# Patient Record
Sex: FEMALE | Race: WHITE | NOT HISPANIC OR LATINO | Employment: UNEMPLOYED | URBAN - METROPOLITAN AREA
[De-identification: names, ages, dates, MRNs, and addresses within clinical notes are randomized per-mention and may not be internally consistent; named-entity substitution may affect disease eponyms.]

---

## 2023-08-15 ENCOUNTER — OFFICE VISIT (OUTPATIENT)
Dept: OBSTETRICS AND GYNECOLOGY | Facility: CLINIC | Age: 33
End: 2023-08-15
Payer: COMMERCIAL

## 2023-08-15 VITALS
BODY MASS INDEX: 20.09 KG/M2 | WEIGHT: 125 LBS | HEIGHT: 66 IN | DIASTOLIC BLOOD PRESSURE: 58 MMHG | SYSTOLIC BLOOD PRESSURE: 106 MMHG

## 2023-08-15 DIAGNOSIS — N92.6 MISSED MENSES: Primary | ICD-10-CM

## 2023-08-15 LAB
B-HCG UR QL: POSITIVE
BILIRUB BLD-MCNC: NEGATIVE MG/DL
CLARITY, POC: CLEAR
COLOR UR: YELLOW
EXPIRATION DATE: ABNORMAL
GLUCOSE UR STRIP-MCNC: NEGATIVE MG/DL
INTERNAL NEGATIVE CONTROL: ABNORMAL
INTERNAL POSITIVE CONTROL: ABNORMAL
KETONES UR QL: NEGATIVE
LEUKOCYTE EST, POC: NEGATIVE
Lab: ABNORMAL
NITRITE UR-MCNC: NEGATIVE MG/ML
PH UR: 5 [PH] (ref 5–8)
PROT UR STRIP-MCNC: NEGATIVE MG/DL
RBC # UR STRIP: NEGATIVE /UL
SP GR UR: 1 (ref 1–1.03)
UROBILINOGEN UR QL: NORMAL

## 2023-08-15 RX ORDER — PRENATAL VIT/IRON FUM/FOLIC AC 27MG-0.8MG
1 TABLET ORAL
COMMUNITY

## 2023-08-15 NOTE — PROGRESS NOTES
Confirmation of pregnancy     Chief Complaint   Patient presents with    Amenorrhea         Hannah Johnson is being seen today for evaluation of absence of menses. Due to her period being late, she tested for pregnancy and had + home UPT. She is a 33 y.o. . This problem is new to me, the examiner.       Currently breastfeeding; had 4 menstrual cycles since  in 3/2022  Just moved here from [place] 9-10 months ago       LNMP: 23  Confident with date: Yes  Taking prenatal vitamins: Yes. Needs RX: No  Planned pregnancy: No  Prior obstetric issues, potential pregnancy concerns: GBS+; & possible PP depression; - all ; delivered in [place] (records requested)  Family history of genetic issues (includes FOB): no  Varicella Hx: yes  Flu vaccine: no  COVID 19 vaccine: no. Booster vaccine: no  History of STDs: no  Current medications: PNV, Tylenol PRN  Last pap smear: unsure  Smoker: No  Drug or alcohol abuse: No  H/O physical, emotional or sexual abuse:no  H/O mental health disorder: anxiety; was on medication 7-8 years ago; stable now  Prior testing for Cystic Fibrosis Carrier or Sickle Cell Trait- no  Prepregnancy BMI - Body mass index is 20.18 kg/mý.    Past Medical History:   Diagnosis Date    Anxiety     Migraine        Past Surgical History:   Procedure Laterality Date    WISDOM TOOTH EXTRACTION           Current Outpatient Medications:     Prenatal Vit-Fe Fumarate-FA (prenatal vitamin 27-0.8) 27-0.8 MG tablet tablet, Take 1 tablet by mouth., Disp: , Rfl:     Allergies   Allergen Reactions    Penicillins Other (See Comments)     Unknown-happened as a child    Sulfa Antibiotics Other (See Comments)     Unknown-happened as a child       Social History     Socioeconomic History    Marital status:    Tobacco Use    Smoking status: Never    Smokeless tobacco: Never   Vaping Use    Vaping Use: Never used   Substance and Sexual Activity    Alcohol use: Not Currently    Drug use: Never    Sexual  "activity: Yes     Partners: Male     Birth control/protection: None       Family History   Problem Relation Age of Onset    Hypertension Father     Hypertension Mother     Coronary artery disease Paternal Grandfather     Stroke Paternal Grandmother     Ovarian cancer Maternal Grandmother         d'xd later in life    Hypertension Maternal Grandfather        Review of systems     Review of Systems   Gastrointestinal:  Positive for nausea. Negative for vomiting.   Genitourinary:  Positive for menstrual problem. Negative for pelvic pain and vaginal bleeding.   Musculoskeletal:  Positive for back pain.     Objective    /58   Ht 167.6 cm (66\")   Wt 56.7 kg (125 lb)   LMP 06/23/2023 (Exact Date)   BMI 20.18 kg/mý     Physical Exam  Vitals reviewed.   Constitutional:       General: She is awake. She is not in acute distress.     Appearance: She is not ill-appearing.   Eyes:      Conjunctiva/sclera: Conjunctivae normal.   Pulmonary:      Effort: Pulmonary effort is normal. No respiratory distress.   Musculoskeletal:      Cervical back: Neck supple. No rigidity.   Skin:     General: Skin is warm and dry.      Capillary Refill: Capillary refill takes less than 2 seconds.   Neurological:      Mental Status: She is alert and oriented to person, place, and time.   Psychiatric:         Mood and Affect: Mood and affect normal.         Behavior: Behavior normal.       Assessment/Plan      Missed menses: + UPT in office. LNMP 6/23/23 = 7 week EGA with an EDC=3/29/2024. US confirms IUP with +FCA: Yes. Oriented to practice.     Pregnancy: Disc importance of regular prenatal care. Enc PNV daily. Counseled on providers and on call phone. Disc Tylenol products are ok and encouraged no ibuprofen or ASA in pregnancy.  Disc exercise in pregnancy, diet, expected weight gain, etc. Enc no use of tobacco, vaping, drugs, or alcohol during pregnancy. Rev warn s/s of SAB.     Labs: Pt counseled on genetic screening, Quad screen, AFP, and " NIPS. Does NOT want genetic screening.    BMI is within normal parameters. No other follow-up for BMI required.        Smoker- non-smoker    6.   COVID19 precautions were reviewed with the patient. Continue to encourage social distancing, wearing a mask, and good hand hygiene.  I wore a mask, protective eye wear, and gloves during this patient encounter.  Patient also wearing a surgical mask and social distancing was observed. Hand hygeine performed before and after seeing the patient. Also encouraged COVID booster vaccine at 6 month interval from last COVID vaccine.     7.  Flu vaccine. Encouraged the flu vaccine during pregnancy. Discuss normal physiological changes during pregnancy increase the susceptibility of the flu virus and increase the risk of severe illness for the pregnant woman. Disc flu can be harmful to the unborn baby as well. Enc the flu vaccine. Disc with patient that getting the flu vaccine is the first and most important step in protecting against the flu. Flu vaccines given during pregnancy help protect both the mother and the baby. Getting the flu vaccine during pregnancy also helps protect the  from flu illness for several months after their birth, when then are too young to get vaccinated. Also disc the importance of good hand hygiene and avoiding people who are sick.    8. Anxiety- controlled without medication    9. Short interval between pregnancy- currently breastfeeding;  in 3/2022; needs CL between 14-16wga        All questions answered.     Counseling was given to patient for the following topics: diagnostic results, instructions for management, risk factor reductions, prognosis, patient and family education, impressions, risks and benefits of treatment options, and importance of treatment compliance . Total time of the encounter was 25 minutes and 20 minutes was spent counseling.    Encounter Diagnoses   Name Primary?    Missed menses Yes       Diagnoses and all orders for  this visit:    Missed menses  -     POC Urinalysis Dipstick  -     POC Pregnancy, Urine    Other orders  -     Prenatal Vit-Fe Fumarate-FA (prenatal vitamin 27-0.8) 27-0.8 MG tablet tablet; Take 1 tablet by mouth.        RTO in 3 weeks for new OB exam/PAP, labs      Lisa Hidalgo, APRN  8/15/2023  16:23 EDT

## 2023-09-12 ENCOUNTER — INITIAL PRENATAL (OUTPATIENT)
Dept: OBSTETRICS AND GYNECOLOGY | Facility: CLINIC | Age: 33
End: 2023-09-12
Payer: COMMERCIAL

## 2023-09-12 VITALS — BODY MASS INDEX: 20.05 KG/M2 | SYSTOLIC BLOOD PRESSURE: 102 MMHG | WEIGHT: 124.2 LBS | DIASTOLIC BLOOD PRESSURE: 58 MMHG

## 2023-09-12 DIAGNOSIS — Z78.9 BREASTFEEDING (INFANT): ICD-10-CM

## 2023-09-12 DIAGNOSIS — Z86.59 HISTORY OF POSTPARTUM DEPRESSION, CURRENTLY PREGNANT: ICD-10-CM

## 2023-09-12 DIAGNOSIS — Z01.419 PAP SMEAR, LOW-RISK: ICD-10-CM

## 2023-09-12 DIAGNOSIS — O99.891 HISTORY OF POSTPARTUM DEPRESSION, CURRENTLY PREGNANT: ICD-10-CM

## 2023-09-12 DIAGNOSIS — F41.9 ANXIETY: ICD-10-CM

## 2023-09-12 DIAGNOSIS — Z34.00 INITIAL OBSTETRIC VISIT, ANTEPARTUM: Primary | ICD-10-CM

## 2023-09-12 DIAGNOSIS — Z36.9 ENCOUNTER FOR ANTENATAL SCREENING, UNSPECIFIED: ICD-10-CM

## 2023-09-12 DIAGNOSIS — Z11.51 SCREENING FOR HUMAN PAPILLOMAVIRUS: ICD-10-CM

## 2023-09-12 LAB
BILIRUB BLD-MCNC: NEGATIVE MG/DL
CLARITY, POC: CLEAR
COLOR UR: YELLOW
GLUCOSE UR STRIP-MCNC: NEGATIVE MG/DL
KETONES UR QL: NEGATIVE
LEUKOCYTE EST, POC: NEGATIVE
NITRITE UR-MCNC: NEGATIVE MG/ML
PH UR: 5 [PH] (ref 5–8)
PROT UR STRIP-MCNC: NEGATIVE MG/DL
RBC # UR STRIP: NEGATIVE /UL
SP GR UR: 1 (ref 1–1.03)
UROBILINOGEN UR QL: NORMAL

## 2023-09-12 NOTE — PROGRESS NOTES
Initial OB Visit     Chief Complaint   Patient presents with    Initial Prenatal Visit       Hannah Johnson is being seen today for her first obstetrical visit.  She is a 33 y.o.    11w4d gestation. This problem is new to me: no      # 1 - Date: 17, Sex: Female, Weight: 3714 g (8 lb 3 oz), GA: 39w0d, Delivery: Vaginal, Spontaneous, Apgar1: None, Apgar5: None, Living: Living, Birth Comments: None    # 2 - Date: 18, Sex: Male, Weight: 3572 g (7 lb 14 oz), GA: 40w0d, Delivery: Vaginal, Spontaneous, Apgar1: None, Apgar5: None, Living: Living, Birth Comments: None    # 3 - Date: 22, Sex: Female, Weight: 3402 g (7 lb 8 oz), GA: 39w2d, Delivery: Vaginal, Spontaneous, Apgar1: None, Apgar5: None, Living: Living, Birth Comments: None    # 4 - Date: None, Sex: None, Weight: None, GA: None, Delivery: None, Apgar1: None, Apgar5: None, Living: None, Birth Comments: None      LNMP: 23  Confident with date: Yes  Taking prenatal vitamins: Yes  Planned pregnancy: No  Prior obstetric issues, potential pregnancy concerns: possible PP depression; records from previous deliveries in [place] in chart (reviewed)  Family history of genetic issues (includes FOB): no  Prior infections concerning in pregnancy (Rash, fever in last 2 weeks): possible stomach bug last week; upper abd pain, diarrhea, chills; no vomiting; aversion to food; right calf pain  Varicella Hx: yes  Flu vaccine: no  COVID Vaccine: no  History of STDs: no  Current medications: PNV, Tylenol PRN for CP and headache  Last pap smear: unsure  Smoker: No  Drug or alcohol abuse: No  H/O Physical, mental or sexual abuse: no  H/O mental health disorder: anxiety; was on medication 7-8 years ago; feels stable now  Prior testing for Cystic Fibrosis Carrier or Sickle Cell Trait- no  Prepregnancy BMI: Body mass index is 20.05 kg/m².      Past Medical History:   Diagnosis Date    Anxiety     Migraine        Past Surgical History:   Procedure Laterality Date     WISDOM TOOTH EXTRACTION           Current Outpatient Medications:     Prenatal Vit-Fe Fumarate-FA (prenatal vitamin 27-0.8) 27-0.8 MG tablet tablet, Take 1 tablet by mouth., Disp: , Rfl:     Allergies   Allergen Reactions    Penicillins Other (See Comments)     Unknown-happened as a child    Sulfa Antibiotics Other (See Comments)     Unknown-happened as a child       Social History     Socioeconomic History    Marital status:    Tobacco Use    Smoking status: Never    Smokeless tobacco: Never   Vaping Use    Vaping Use: Never used   Substance and Sexual Activity    Alcohol use: Not Currently    Drug use: Never    Sexual activity: Yes     Partners: Male     Birth control/protection: None       Family History   Problem Relation Age of Onset    Hypertension Father     Hypertension Mother     Coronary artery disease Paternal Grandfather     Stroke Paternal Grandmother     Ovarian cancer Maternal Grandmother         d'xd later in life    Hypertension Maternal Grandfather        Review of systems     All other systems reviewed and are negative except for: Constitutional: positive for chills and fevers  Respiratory: positive for SOA  Cardiovascular: positive for chest pain  Gastrointestinal: positive for abdominal pain and diarrhea  Musculoskeletal: positive for right calf pain     Objective    /58   Wt 56.3 kg (124 lb 3.2 oz)   LMP 06/23/2023 (Exact Date)   BMI 20.05 kg/m²     General Appearance:    Alert, cooperative, in no acute distress, habitus normal   Head:    Normocephalic, without obvious abnormality, atraumatic   Neck:   No adenopathy, supple, trachea midline, no thyromegaly   Lungs:     Clear to auscultation,respirations regular, even and     unlabored    Heart:    Regular rhythm and normal rate, normal S1 and S2, no       murmur, no gallop, no rub, no click   Breast Exam:    Deferred   Abdomen:     Hyperactive bowel sounds, no masses, soft, epigastric tenderness, non-distended, no guarding, no  rebound                tenderness   Genitalia:    Vulva - BUS-WNL, NEFG    Vagina - No discharge, No bleeding    Cervix - No Lesions, closed     Uterus - Consistent with 11 weeks    Adnexa - No mass, NT    Pelvimetry - clinically adequate, gynecoid pelvis     Extremities:   Moves all extremities well, no edema, no cyanosis, no        redness   Skin:   No bleeding, bruising or rash   Lymph nodes:   No palpable adenopathy   Neurologic:   Sensation intact, A&O times 3         Assessment/Plan    1) Pregnancy at 11w4d- + FHT noted with doppler.  EDC established 3/29/24 and confirmed by LNMP/US.     2) OB exam: OB exam completed: Yes. New OB bag provided Yes. Pap collected: Yes. Provided list of safe medications to take while in pregnancy.    3) Labs: OB labs collected: Yes. Counseled on genetic screening/NIPS: Yes, she declines both. Counseled on Quad screen and AFP: No, she is too early.    4) BMI is within normal parameters. No other follow-up for BMI required.     5)  Prenatal care: Oriented to the office and prenatal care. Encourage prenatal vitamins. Disc Tylenol products are fine, avoid aspirin and ibuprofen; Zika (travel restrictions/ok to use insect repellant); not to change cat litter; food restrictions; exercise; avoidance of alcohol, tobacco, drugs and saunas/hot tubs.     6) COVID19 precautions were reviewed with the patient. Continue to encourage social distancing, wearing a mask, and good hand hygiene.  Hand hygeine performed before and after seeing the patient.     7) anxiety- not on medications/no counseling; patient reports symptoms managed well with no meds.  Disc importance of mental health during pregnancy and availability of meds/counseling if needed     8) currently breastfeeding- child is 18 mos old; rec stay well hydrated    9) hx of post-partum depression- watch for signs    10) possible stomach bug- stay hydrated; if symptoms worsen, go to ER for evaluation    All questions answered.     I spent  30+ minutes caring for Hannah on this date of service. This time includes time spent by me in the following activities: preparing for the visit, reviewing tests, obtaining and/or reviewing a separately obtained history, performing a medically appropriate examination and/or evaluation, counseling and educating the patient/family/caregiver, ordering medications, tests, or procedures, and documenting information in the medical record.  Reviewed previous medical records received from another facility.    RTO 4 weeks for OBT, AFP    Lisa Hidalgo, APRN    9/17/2023  17:27 EDT

## 2023-09-13 LAB
ABO GROUP BLD: NORMAL
BASOPHILS # BLD AUTO: 0 X10E3/UL (ref 0–0.2)
BASOPHILS NFR BLD AUTO: 0 %
BLD GP AB SCN SERPL QL: NEGATIVE
EOSINOPHIL # BLD AUTO: 0.1 X10E3/UL (ref 0–0.4)
EOSINOPHIL NFR BLD AUTO: 1 %
ERYTHROCYTE [DISTWIDTH] IN BLOOD BY AUTOMATED COUNT: 13.3 % (ref 11.7–15.4)
HBA1C MFR BLD: 5.2 % (ref 4.8–5.6)
HBV SURFACE AG SERPL QL IA: NEGATIVE
HCT VFR BLD AUTO: 37 % (ref 34–46.6)
HCV IGG SERPL QL IA: NON REACTIVE
HGB A MFR BLD ELPH: 97.5 % (ref 96.4–98.8)
HGB A2 MFR BLD ELPH: 2.5 % (ref 1.8–3.2)
HGB BLD-MCNC: 12.5 G/DL (ref 11.1–15.9)
HGB F MFR BLD ELPH: 0 % (ref 0–2)
HGB FRACT BLD-IMP: NORMAL
HGB S MFR BLD ELPH: 0 %
HIV 1+2 AB+HIV1 P24 AG SERPL QL IA: NON REACTIVE
IMM GRANULOCYTES # BLD AUTO: 0 X10E3/UL (ref 0–0.1)
IMM GRANULOCYTES NFR BLD AUTO: 0 %
LYMPHOCYTES # BLD AUTO: 1.8 X10E3/UL (ref 0.7–3.1)
LYMPHOCYTES NFR BLD AUTO: 23 %
MCH RBC QN AUTO: 29.8 PG (ref 26.6–33)
MCHC RBC AUTO-ENTMCNC: 33.8 G/DL (ref 31.5–35.7)
MCV RBC AUTO: 88 FL (ref 79–97)
MONOCYTES # BLD AUTO: 0.4 X10E3/UL (ref 0.1–0.9)
MONOCYTES NFR BLD AUTO: 6 %
NEUTROPHILS # BLD AUTO: 5.3 X10E3/UL (ref 1.4–7)
NEUTROPHILS NFR BLD AUTO: 70 %
PLATELET # BLD AUTO: 280 X10E3/UL (ref 150–450)
RBC # BLD AUTO: 4.19 X10E6/UL (ref 3.77–5.28)
RH BLD: POSITIVE
RPR SER QL: NON REACTIVE
RUBV IGG SERPL IA-ACNC: 1.09 INDEX
VZV IGG SER IA-ACNC: 643 INDEX
WBC # BLD AUTO: 7.6 X10E3/UL (ref 3.4–10.8)

## 2023-09-14 LAB
AMPHETAMINES UR QL SCN: NEGATIVE NG/ML
BACTERIA UR CULT: NO GROWTH
BACTERIA UR CULT: NORMAL
BARBITURATES UR QL SCN: NEGATIVE NG/ML
BENZODIAZ UR QL SCN: NEGATIVE NG/ML
BUPRENORPHINE UR QL: NEGATIVE NG/ML
BZE UR QL SCN: NEGATIVE NG/ML
C TRACH RRNA CVX QL NAA+PROBE: NEGATIVE
CANNABINOIDS UR QL SCN: NEGATIVE NG/ML
CREAT UR-MCNC: 151.5 MG/DL (ref 20–300)
CYTOLOGIST CVX/VAG CYTO: NORMAL
CYTOLOGY CVX/VAG DOC CYTO: NORMAL
CYTOLOGY CVX/VAG DOC THIN PREP: NORMAL
DX ICD CODE: NORMAL
FENTANYL UR-MCNC: NEGATIVE PG/ML
HIV 1 & 2 AB SER-IMP: NORMAL
HPV GENOTYPE REFLEX: NORMAL
HPV I/H RISK 4 DNA CVX QL PROBE+SIG AMP: NEGATIVE
LABORATORY COMMENT REPORT: NORMAL
MEPERIDINE UR QL: NEGATIVE NG/ML
METHADONE UR QL SCN: NEGATIVE NG/ML
N GONORRHOEA RRNA CVX QL NAA+PROBE: NEGATIVE
OPIATES UR QL SCN: NEGATIVE NG/ML
OTHER STN SPEC: NORMAL
OXYCODONE+OXYMORPHONE UR QL SCN: NEGATIVE NG/ML
PCP UR QL: NEGATIVE NG/ML
PH UR: 5.5 [PH] (ref 4.5–8.9)
PROPOXYPH UR QL SCN: NEGATIVE NG/ML
STAT OF ADQ CVX/VAG CYTO-IMP: NORMAL
T VAGINALIS RRNA SPEC QL NAA+PROBE: NEGATIVE
TRAMADOL UR QL SCN: NEGATIVE NG/ML

## 2023-09-17 PROBLEM — O99.891 HISTORY OF POSTPARTUM DEPRESSION, CURRENTLY PREGNANT: Status: ACTIVE | Noted: 2023-09-17

## 2023-09-17 PROBLEM — Z78.9 BREASTFEEDING (INFANT): Status: ACTIVE | Noted: 2023-09-17

## 2023-09-17 PROBLEM — Z86.59 HISTORY OF POSTPARTUM DEPRESSION, CURRENTLY PREGNANT: Status: ACTIVE | Noted: 2023-09-17

## 2023-09-20 ENCOUNTER — TELEPHONE (OUTPATIENT)
Dept: OBSTETRICS AND GYNECOLOGY | Facility: CLINIC | Age: 33
End: 2023-09-20

## 2023-09-20 NOTE — TELEPHONE ENCOUNTER
Hub staff attempted to follow warm transfer process and was unsuccessful     Caller: Hannah Johnson    Relationship to patient: Self    Best call back number: [phone]    Patient is needing: PT STATES SHE IS RETURNING A MISSED CALL TO [facility] IN REGARDS TO STOMACH PAIN. PLEASE ADVISE PT.

## 2023-10-26 ENCOUNTER — TELEPHONE (OUTPATIENT)
Dept: OBSTETRICS AND GYNECOLOGY | Facility: CLINIC | Age: 33
End: 2023-10-26
Payer: COMMERCIAL

## 2023-11-10 ENCOUNTER — TELEPHONE (OUTPATIENT)
Dept: OBSTETRICS AND GYNECOLOGY | Facility: CLINIC | Age: 33
End: 2023-11-10
Payer: COMMERCIAL